# Patient Record
Sex: FEMALE | Race: BLACK OR AFRICAN AMERICAN | URBAN - METROPOLITAN AREA
[De-identification: names, ages, dates, MRNs, and addresses within clinical notes are randomized per-mention and may not be internally consistent; named-entity substitution may affect disease eponyms.]

---

## 2020-07-25 ENCOUNTER — TELEPHONE ENCOUNTER (OUTPATIENT)
Dept: URBAN - METROPOLITAN AREA CLINIC 13 | Facility: CLINIC | Age: 70
End: 2020-07-25

## 2020-07-25 RX ORDER — POLYETHYLENE GLYCOL 3350, SODIUM CHLORIDE, SODIUM BICARBONATE AND POTASSIUM CHLORIDE WITH LEMON FLAVOR 420; 11.2; 5.72; 1.48 G/4L; G/4L; G/4L; G/4L
TAKE 1/2 GALLON AT 5:00 PM DAY BEFORE PROCEDURE, TAKE SECOND 1/2 OF GALLON 6 HRS PRIOR TO PROCEDURE POWDER, FOR SOLUTION ORAL
Qty: 1 | Refills: 0 | OUTPATIENT
Start: 2017-04-25 | End: 2017-05-15

## 2020-07-25 RX ORDER — POLYETHYLENE GLYCOL 3350, SODIUM CHLORIDE, SODIUM BICARBONATE AND POTASSIUM CHLORIDE WITH LEMON FLAVOR 420; 11.2; 5.72; 1.48 G/4L; G/4L; G/4L; G/4L
USE AS DIRECTED POWDER, FOR SOLUTION ORAL
Qty: 1 | Refills: 0 | OUTPATIENT
Start: 2017-03-31 | End: 2017-04-19

## 2020-07-25 RX ORDER — IBUPROFEN 800 MG/1
TAKE 1 TABLET 3 TIMES DAILY AS NEEDED TABLET ORAL
Refills: 0 | OUTPATIENT
End: 2017-04-19

## 2020-07-26 ENCOUNTER — TELEPHONE ENCOUNTER (OUTPATIENT)
Dept: URBAN - METROPOLITAN AREA CLINIC 13 | Facility: CLINIC | Age: 70
End: 2020-07-26

## 2020-07-26 RX ORDER — CLOTRIMAZOLE AND BETAMETHASONE DIPROPIONATE 10; .5 MG/G; MG/G
CREAM TOPICAL
Qty: 45 | Refills: 0 | Status: ACTIVE | COMMUNITY
Start: 2017-01-03

## 2020-07-26 RX ORDER — IBUPROFEN 800 MG/1
TABLET ORAL
Qty: 90 | Refills: 0 | Status: ACTIVE | COMMUNITY
Start: 2016-10-17

## 2020-07-26 RX ORDER — OXYCODONE AND ACETAMINOPHEN 5; 325 MG/1; MG/1
TABLET ORAL
Qty: 20 | Refills: 0 | Status: ACTIVE | COMMUNITY
Start: 2016-09-30

## 2020-07-26 RX ORDER — LISINOPRIL AND HYDROCHLOROTHIAZIDE TABLETS 20; 25 MG/1; MG/1
TABLET ORAL
Qty: 90 | Refills: 0 | Status: ACTIVE | COMMUNITY
Start: 2016-10-17

## 2020-07-26 RX ORDER — TRAMADOL HYDROCHLORIDE 50 MG/1
TABLET ORAL
Qty: 60 | Refills: 0 | Status: ACTIVE | COMMUNITY
Start: 2016-12-08

## 2020-07-26 RX ORDER — CLONIDINE HYDROCHLORIDE 0.2 MG/1
TABLET ORAL
Qty: 60 | Refills: 0 | Status: ACTIVE | COMMUNITY
Start: 2016-12-08

## 2020-07-26 RX ORDER — HYDROCODONE BITARTRATE AND ACETAMINOPHEN 5; 325 MG/1; MG/1
TABLET ORAL
Qty: 14 | Refills: 0 | Status: ACTIVE | COMMUNITY
Start: 2017-01-03

## 2020-07-26 RX ORDER — LISINOPRIL AND HYDROCHLOROTHIAZIDE TABLETS 20; 25 MG/1; MG/1
TAKE 1 TABLET ONCE DAILY TABLET ORAL
Refills: 0 | Status: ACTIVE | COMMUNITY

## 2020-07-26 RX ORDER — LOSARTAN POTASSIUM 50 MG/1
TABLET, FILM COATED ORAL
Qty: 30 | Refills: 0 | Status: ACTIVE | COMMUNITY
Start: 2017-02-16

## 2020-07-26 RX ORDER — LOSARTAN POTASSIUM 50 MG/1
TAKE 1 TABLET DAILY TABLET, FILM COATED ORAL
Refills: 0 | Status: ACTIVE | COMMUNITY

## 2020-07-26 RX ORDER — DEXLANSOPRAZOLE 60 MG/1
TAKE ONE CAPSULE BY MOUTH ONE TIME DAILY BEFORE BREAKFAST CAPSULE, DELAYED RELEASE ORAL
Qty: 30 | Refills: 5 | Status: ACTIVE | COMMUNITY
Start: 2017-05-15

## 2020-07-26 RX ORDER — DEXLANSOPRAZOLE 60 MG/1
CAPSULE, DELAYED RELEASE ORAL
Qty: 90 | Refills: 0 | Status: ACTIVE | COMMUNITY
Start: 2016-10-17

## 2021-01-29 ENCOUNTER — OFFICE VISIT (OUTPATIENT)
Dept: URBAN - METROPOLITAN AREA CLINIC 113 | Facility: CLINIC | Age: 71
End: 2021-01-29
Payer: MEDICARE

## 2021-01-29 ENCOUNTER — LAB OUTSIDE AN ENCOUNTER (OUTPATIENT)
Dept: URBAN - METROPOLITAN AREA CLINIC 113 | Facility: CLINIC | Age: 71
End: 2021-01-29

## 2021-01-29 VITALS
BODY MASS INDEX: 33.12 KG/M2 | HEIGHT: 67 IN | RESPIRATION RATE: 18 BRPM | HEART RATE: 83 BPM | SYSTOLIC BLOOD PRESSURE: 132 MMHG | WEIGHT: 211 LBS | DIASTOLIC BLOOD PRESSURE: 77 MMHG | TEMPERATURE: 98 F

## 2021-01-29 DIAGNOSIS — Z12.11 COLON CANCER SCREENING: ICD-10-CM

## 2021-01-29 DIAGNOSIS — K22.2 SCHATZKI'S RING: ICD-10-CM

## 2021-01-29 DIAGNOSIS — K21.9 GASTROESOPHAGEAL REFLUX DISEASE, UNSPECIFIED WHETHER ESOPHAGITIS PRESENT: ICD-10-CM

## 2021-01-29 DIAGNOSIS — R13.10 ESOPHAGEAL DYSPHAGIA: ICD-10-CM

## 2021-01-29 PROCEDURE — G8482 FLU IMMUNIZE ORDER/ADMIN: HCPCS | Performed by: NURSE PRACTITIONER

## 2021-01-29 PROCEDURE — G9903 PT SCRN TBCO ID AS NON USER: HCPCS | Performed by: NURSE PRACTITIONER

## 2021-01-29 PROCEDURE — 99204 OFFICE O/P NEW MOD 45 MIN: CPT | Performed by: NURSE PRACTITIONER

## 2021-01-29 PROCEDURE — G8427 DOCREV CUR MEDS BY ELIG CLIN: HCPCS | Performed by: NURSE PRACTITIONER

## 2021-01-29 RX ORDER — DEXLANSOPRAZOLE 60 MG/1
CAPSULE, DELAYED RELEASE ORAL
Qty: 90 | Refills: 0 | Status: ACTIVE | COMMUNITY
Start: 2016-10-17

## 2021-01-29 RX ORDER — HYDROCODONE BITARTRATE AND ACETAMINOPHEN 5; 325 MG/1; MG/1
TABLET ORAL
Qty: 14 | Refills: 0 | Status: ACTIVE | COMMUNITY
Start: 2017-01-03

## 2021-01-29 RX ORDER — SUCRALFATE 1 G/1
1 TABLET; DISSOLVE IN 2 TSP WARM WATER TABLET ORAL
Qty: 90 | Refills: 3 | OUTPATIENT

## 2021-01-29 RX ORDER — LISINOPRIL AND HYDROCHLOROTHIAZIDE TABLETS 20; 25 MG/1; MG/1
TAKE 1 TABLET ONCE DAILY TABLET ORAL
Refills: 0 | Status: ACTIVE | COMMUNITY

## 2021-01-29 RX ORDER — CLOTRIMAZOLE AND BETAMETHASONE DIPROPIONATE 10; .5 MG/G; MG/G
CREAM TOPICAL
Qty: 45 | Refills: 0 | Status: ACTIVE | COMMUNITY
Start: 2017-01-03

## 2021-01-29 RX ORDER — LOSARTAN POTASSIUM 50 MG/1
TAKE 1 TABLET DAILY TABLET, FILM COATED ORAL
Refills: 0 | Status: ACTIVE | COMMUNITY

## 2021-01-29 NOTE — HPI-OTHER HISTORIES
EGD 4/19/17.  Findings included a mild Schatzki's ring at the GE junction status post Savary dilation to 17 mm, a small hiatal hernia, mild diffuse gastric erythema, multiple gastric fundic gland polyps.  Gastric biopsies showed chemical gastropathy, PPI effect, negative for H. pylori. Barium swallow with UGI (2/24/15) : sliding hiatal hernia and nonobstructing lower esophageal ring. Abd US (7/20/15) : normal US. Positive Baldwin's sign. HIDA scan (7/27/15) : negative with EF of 95%.

## 2021-01-29 NOTE — HPI-TODAY'S VISIT:
This is a 70-year-old female with a history of hypertension, GERD, dysphagia associated with a Schatzki's ring status post dilation in 2017 presenting for evaluation of difficulty swallowing and GERD.  She was last seen in 2017 following an EGD with dilation of a Schatzki's ring.  Dysphagia had improved.  She continues to take Dexilant 60 mg daily.  She has been doing well until recently.  For the last month, she has experienced heartburn and regurgitation.  She also reports that she is having to chew well and is not eating bread in order to avoid difficulty swallowing.  She describes a recent episode during which material lodged in the lower part of her chest associated with pain relieved with drinking water.  She denies abdominal pain or other abdominal symptoms.  She denies NSAID use.  She denies a family history of colon cancer.  She has not had a prior colonoscopy but reports a negative Cologuard last year.

## 2021-01-31 PROBLEM — 235595009: Status: ACTIVE | Noted: 2021-01-29

## 2021-01-31 PROBLEM — 235623002: Status: ACTIVE | Noted: 2021-01-29

## 2021-01-31 PROBLEM — 40890009: Status: ACTIVE | Noted: 2021-01-29

## 2021-04-08 ENCOUNTER — OFFICE VISIT (OUTPATIENT)
Dept: URBAN - METROPOLITAN AREA CLINIC 113 | Facility: CLINIC | Age: 71
End: 2021-04-08

## 2021-05-09 ENCOUNTER — ERX REFILL RESPONSE (OUTPATIENT)
Dept: URBAN - METROPOLITAN AREA CLINIC 113 | Facility: CLINIC | Age: 71
End: 2021-05-09

## 2021-05-09 RX ORDER — SUCRALFATE 1 G/1
1 TABLET; DISSOLVE IN 2 TSP WARM WATER TABLET ORAL
Qty: 90 | Refills: 3

## 2021-08-22 ENCOUNTER — ERX REFILL RESPONSE (OUTPATIENT)
Dept: URBAN - METROPOLITAN AREA CLINIC 113 | Facility: CLINIC | Age: 71
End: 2021-08-22

## 2021-08-22 RX ORDER — SUCRALFATE 1 G/1
1 TABLET; DISSOLVE IN 2 TSP WARM WATER TABLET ORAL
Qty: 90 | Refills: 3 | OUTPATIENT

## 2021-08-22 RX ORDER — SUCRALFATE 1 G/1
1 TABLET DISSOLVE IN 2 TSP WARM WATER 4 TIMES DAILY AS NEEDED FOR HEARTBURN ORALLY TABLET ORAL
Qty: 270 TABLET | Refills: 2 | OUTPATIENT

## 2023-02-23 ENCOUNTER — ERX REFILL RESPONSE (OUTPATIENT)
Dept: URBAN - METROPOLITAN AREA CLINIC 113 | Facility: CLINIC | Age: 73
End: 2023-02-23

## 2023-02-23 RX ORDER — SUCRALFATE 1 G/1
DISSOLVE 1 TABLET IN 2 TEASPOON OF WARM WATER FOUR TIMES/DAY FOR HEARTBURN TABLET ORAL
Qty: 270 TABLET | Refills: 2 | OUTPATIENT

## 2023-02-23 RX ORDER — SUCRALFATE 1 G/1
DISSOLVE 1 TABLET IN 2 TEASPOON OF WARM WATER FOUR TIMES/DAY FOR HEARTBURN TABLET ORAL
Qty: 270 TABLET | Refills: 1 | OUTPATIENT

## 2023-02-25 ENCOUNTER — ERX REFILL RESPONSE (OUTPATIENT)
Dept: URBAN - METROPOLITAN AREA CLINIC 113 | Facility: CLINIC | Age: 73
End: 2023-02-25

## 2023-02-25 RX ORDER — SUCRALFATE 1 G/1
DISSOLVE 1 TABLET IN 2 TEASPOON OF WARM WATER FOUR TIMES/DAY FOR HEARTBURN TABLET ORAL
Qty: 270 TABLET | Refills: 0 | OUTPATIENT

## 2023-02-25 RX ORDER — SUCRALFATE 1 G/1
DISSOLVE 1 TABLET IN 2 TEASPOON OF WARM WATER FOUR TIMES/DAY FOR HEARTBURN TABLET ORAL
Qty: 270 TABLET | Refills: 2 | OUTPATIENT

## 2023-04-14 ENCOUNTER — DASHBOARD ENCOUNTERS (OUTPATIENT)
Age: 73
End: 2023-04-14

## 2023-04-14 ENCOUNTER — OFFICE VISIT (OUTPATIENT)
Dept: URBAN - METROPOLITAN AREA CLINIC 113 | Facility: CLINIC | Age: 73
End: 2023-04-14

## 2023-04-14 RX ORDER — DEXLANSOPRAZOLE 60 MG/1
CAPSULE, DELAYED RELEASE ORAL
Qty: 90 | Refills: 0 | Status: ACTIVE | COMMUNITY
Start: 2016-10-17

## 2023-04-14 RX ORDER — HYDROCODONE BITARTRATE AND ACETAMINOPHEN 5; 325 MG/1; MG/1
TABLET ORAL
Qty: 14 | Refills: 0 | Status: ACTIVE | COMMUNITY
Start: 2017-01-03

## 2023-04-14 RX ORDER — LOSARTAN POTASSIUM 50 MG/1
TAKE 1 TABLET DAILY TABLET, FILM COATED ORAL
Refills: 0 | Status: ACTIVE | COMMUNITY

## 2023-04-14 RX ORDER — CLOTRIMAZOLE AND BETAMETHASONE DIPROPIONATE 10; .5 MG/G; MG/G
CREAM TOPICAL
Qty: 45 | Refills: 0 | Status: ACTIVE | COMMUNITY
Start: 2017-01-03

## 2023-04-14 RX ORDER — LISINOPRIL AND HYDROCHLOROTHIAZIDE TABLETS 20; 25 MG/1; MG/1
TAKE 1 TABLET ONCE DAILY TABLET ORAL
Refills: 0 | Status: ACTIVE | COMMUNITY

## 2023-04-14 RX ORDER — SUCRALFATE 1 G/1
DISSOLVE 1 TABLET IN 2 TEASPOON OF WARM WATER FOUR TIMES/DAY FOR HEARTBURN TABLET ORAL
Qty: 270 TABLET | Refills: 0 | Status: ACTIVE | COMMUNITY

## 2023-04-14 NOTE — HPI-TODAY'S VISIT:
This is a 72-year-old woman with a history of hypertension, GERD, dysphagia associated with a Schatzki's ring status post dilation in 2017 presenting for follow-up. She was last seen 1/29/2021 after a long interval.  She was taking Dexilant 60 mg daily.  She reported recent onset of breakthrough heartburn and regurgitation.  She reported that she had to eat carefully in order to avoid difficulty swallowing and had recently experienced an episode of esophageal dysphagia relieved with drinking water.  She reported a negative Cologuard test in the year prior.  She was to continue Dexilant 60 mg daily.  She was prescribed sucralfate to take as needed.  An EGD with dilation was ordered.  She reported difficulty with transportation and requested time to arrange this prior to scheduling.  She was called 2 weeks later to schedule the exam and declines scheduling and preferred contact the office when she was ready to schedule.  Discussed considering colonoscopy for screening in 2023.

## 2023-09-20 ENCOUNTER — TELEPHONE ENCOUNTER (OUTPATIENT)
Dept: URBAN - METROPOLITAN AREA CLINIC 113 | Facility: CLINIC | Age: 73
End: 2023-09-20